# Patient Record
Sex: FEMALE | Race: BLACK OR AFRICAN AMERICAN
[De-identification: names, ages, dates, MRNs, and addresses within clinical notes are randomized per-mention and may not be internally consistent; named-entity substitution may affect disease eponyms.]

---

## 2020-02-26 ENCOUNTER — HOSPITAL ENCOUNTER (EMERGENCY)
Dept: HOSPITAL 12 - ER | Age: 30
Discharge: HOME | End: 2020-02-26
Payer: COMMERCIAL

## 2020-02-26 VITALS — SYSTOLIC BLOOD PRESSURE: 144 MMHG | DIASTOLIC BLOOD PRESSURE: 94 MMHG

## 2020-02-26 VITALS — WEIGHT: 286 LBS | BODY MASS INDEX: 47.65 KG/M2 | HEIGHT: 65 IN

## 2020-02-26 DIAGNOSIS — R04.2: Primary | ICD-10-CM

## 2020-02-26 DIAGNOSIS — J84.9: ICD-10-CM

## 2020-02-26 PROCEDURE — A4663 DIALYSIS BLOOD PRESSURE CUFF: HCPCS

## 2020-02-26 NOTE — NUR
Patient discharged to home in stable conditon.  Written and verbal after care 
instructions given. 

Patient verbalizes understanding of instructions. Patient ambulated out of ER 
with stable gait.